# Patient Record
Sex: MALE | Race: WHITE | NOT HISPANIC OR LATINO | Employment: OTHER | URBAN - METROPOLITAN AREA
[De-identification: names, ages, dates, MRNs, and addresses within clinical notes are randomized per-mention and may not be internally consistent; named-entity substitution may affect disease eponyms.]

---

## 2018-03-09 ENCOUNTER — OFFICE VISIT (OUTPATIENT)
Dept: ENDOCRINOLOGY | Facility: HOSPITAL | Age: 68
End: 2018-03-09
Payer: MEDICARE

## 2018-03-09 VITALS
DIASTOLIC BLOOD PRESSURE: 90 MMHG | WEIGHT: 213.6 LBS | BODY MASS INDEX: 33.53 KG/M2 | SYSTOLIC BLOOD PRESSURE: 138 MMHG | HEART RATE: 100 BPM | HEIGHT: 67 IN

## 2018-03-09 DIAGNOSIS — E04.1 THYROID NODULE: ICD-10-CM

## 2018-03-09 DIAGNOSIS — E04.2 GOITER, NONTOXIC, MULTINODULAR: ICD-10-CM

## 2018-03-09 DIAGNOSIS — E89.0 POSTOPERATIVE HYPOTHYROIDISM: Primary | ICD-10-CM

## 2018-03-09 PROBLEM — E03.9 ACQUIRED HYPOTHYROIDISM: Status: ACTIVE | Noted: 2018-03-09

## 2018-03-09 PROCEDURE — 99214 OFFICE O/P EST MOD 30 MIN: CPT | Performed by: INTERNAL MEDICINE

## 2018-03-09 RX ORDER — LEVOTHYROXINE SODIUM 88 MCG
88 TABLET ORAL DAILY
Qty: 90 TABLET | Refills: 3 | Status: SHIPPED | OUTPATIENT
Start: 2018-03-09 | End: 2019-03-09 | Stop reason: SDUPTHER

## 2018-03-09 RX ORDER — NITROGLYCERIN 0.4 MG/1
TABLET SUBLINGUAL
Refills: 0 | COMMUNITY
Start: 2017-12-29

## 2018-03-09 RX ORDER — ASPIRIN 81 MG/1
81 TABLET ORAL DAILY
COMMUNITY

## 2018-03-09 RX ORDER — LISINOPRIL 2.5 MG/1
2.5 TABLET ORAL DAILY
Refills: 6 | COMMUNITY
Start: 2018-03-03

## 2018-03-09 RX ORDER — METOPROLOL SUCCINATE 50 MG/1
50 TABLET, EXTENDED RELEASE ORAL DAILY
Refills: 7 | COMMUNITY
Start: 2018-02-21

## 2018-03-09 RX ORDER — LEVOTHYROXINE SODIUM 88 MCG
88 TABLET ORAL DAILY
Refills: 4 | COMMUNITY
Start: 2017-12-10 | End: 2018-03-09 | Stop reason: SDUPTHER

## 2018-03-09 RX ORDER — TICAGRELOR 90 MG/1
90 TABLET ORAL 2 TIMES DAILY
Refills: 11 | COMMUNITY
Start: 2018-01-28 | End: 2019-03-11 | Stop reason: ALTCHOICE

## 2018-03-09 RX ORDER — DIPHENOXYLATE HYDROCHLORIDE AND ATROPINE SULFATE 2.5; .025 MG/1; MG/1
1 TABLET ORAL DAILY
COMMUNITY

## 2018-03-09 RX ORDER — CHLORAL HYDRATE 500 MG
1 CAPSULE ORAL DAILY
COMMUNITY

## 2018-03-09 RX ORDER — SIMVASTATIN 40 MG
40 TABLET ORAL DAILY
Refills: 0 | COMMUNITY
Start: 2017-12-18
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
## 2018-03-09 NOTE — PATIENT INSTRUCTIONS
Thyroid blood work excellent  Continue Synthroid 88 mcg daily  Call if any symptoms of hypothyroidism  Follow up in 1 year with blood work  Hypothyroidism   AMBULATORY CARE:   Hypothyroidism  is a condition that develops when the thyroid gland does not make enough thyroid hormone  Thyroid hormones help control body temperature, heart rate, growth, and weight  Common signs and symptoms include the following: The signs and symptoms may develop slowly, sometimes over several years  · Exhaustion    · Sensitivity to cold    · Headaches or decreased concentration    · Muscle aches or weakness    · Constipation     · Dry, flaky skin or brittle nails    · Thinning hair    · Heavy or irregular monthly periods    · Depression or irritability  Call 911 for any of the following:   · You have sudden chest pain or shortness of breath  · You have a seizure  · You feel like you are going to faint  Seek care immediately if:   · You have diarrhea, tremors, or trouble sleeping  · Your legs, ankles, or feet are swollen  Contact your healthcare provider if:   · You have a fever  · You have chills, a cough, or feel weak and achy  · You have pain and swelling in your muscles and joints  · Your skin is itchy, swollen, or you have a rash  · Your signs and symptoms return or get worse, even after treatment  · You have questions or concerns about your condition or care  Treatment:  Thyroid hormone replacement medicine may bring your thyroid hormone level back to normal  Ask your healthcare provider for more information on other medicines you may need  Follow up with your healthcare provider as directed: You may need to return for more blood tests to check your thyroid hormone level  This will show if you are getting the right amount of thyroid medicine  Write down your questions so you remember to ask them during your visits    © 2017 [employer] Information is for End User's use only and may not be sold, redistributed or otherwise used for commercial purposes  All illustrations and images included in CareNotes® are the copyrighted property of A D [employer] M [employer]  or Yonatan Tellez  The above information is an  only  It is not intended as medical advice for individual conditions or treatments  Talk to your doctor, nurse or pharmacist before following any medical regimen to see if it is safe and effective for you
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
## 2019-03-02 LAB
T4 FREE SERPL-MCNC: 1.41 NG/DL (ref 0.82–1.77)
TSH SERPL DL<=0.005 MIU/L-ACNC: 3.16 UIU/ML (ref 0.45–4.5)

## 2019-03-09 DIAGNOSIS — E04.2 GOITER, NONTOXIC, MULTINODULAR: ICD-10-CM

## 2019-03-09 DIAGNOSIS — E89.0 POSTOPERATIVE HYPOTHYROIDISM: ICD-10-CM

## 2019-03-09 DIAGNOSIS — E04.1 THYROID NODULE: ICD-10-CM

## 2019-03-11 ENCOUNTER — OFFICE VISIT (OUTPATIENT)
Dept: ENDOCRINOLOGY | Facility: HOSPITAL | Age: 69
End: 2019-03-11
Payer: MEDICARE

## 2019-03-11 VITALS
WEIGHT: 221 LBS | DIASTOLIC BLOOD PRESSURE: 90 MMHG | BODY MASS INDEX: 34.69 KG/M2 | HEART RATE: 88 BPM | HEIGHT: 67 IN | SYSTOLIC BLOOD PRESSURE: 142 MMHG

## 2019-03-11 DIAGNOSIS — E04.2 GOITER, NONTOXIC, MULTINODULAR: ICD-10-CM

## 2019-03-11 DIAGNOSIS — E89.0 POSTOPERATIVE HYPOTHYROIDISM: Primary | ICD-10-CM

## 2019-03-11 DIAGNOSIS — E04.1 THYROID NODULE: ICD-10-CM

## 2019-03-11 PROCEDURE — 99214 OFFICE O/P EST MOD 30 MIN: CPT | Performed by: INTERNAL MEDICINE

## 2019-03-11 RX ORDER — LEVOTHYROXINE SODIUM 88 MCG
88 TABLET ORAL DAILY
Qty: 90 TABLET | Refills: 3 | Status: SHIPPED | OUTPATIENT
Start: 2019-03-11 | End: 2020-06-01

## 2019-03-11 RX ORDER — LEVOTHYROXINE SODIUM 88 MCG
TABLET ORAL
Qty: 90 TABLET | Refills: 2 | Status: SHIPPED | OUTPATIENT
Start: 2019-03-11 | End: 2019-03-11 | Stop reason: SDUPTHER
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
## 2019-03-11 NOTE — PATIENT INSTRUCTIONS
Thyroid blood work is normal but a bit more on the underactive side than last year  Since feeling ok, stay on synthroid 88 mcg daily  Follow up in 1 year with blood work and thyroid ultrasound

## 2020-03-17 ENCOUNTER — TELEPHONE (OUTPATIENT)
Dept: ENDOCRINOLOGY | Facility: HOSPITAL | Age: 70
End: 2020-03-17

## 2020-03-17 NOTE — TELEPHONE ENCOUNTER
Received signed request for release of information  Medical records to be sent to Dr Finesse Jaime in [place], [place]  Faxed request to [facility] & paper chart)

## 2020-05-31 DIAGNOSIS — E89.0 POSTOPERATIVE HYPOTHYROIDISM: ICD-10-CM

## 2020-05-31 DIAGNOSIS — E04.2 GOITER, NONTOXIC, MULTINODULAR: ICD-10-CM

## 2020-05-31 DIAGNOSIS — E04.1 THYROID NODULE: ICD-10-CM

## 2020-06-01 RX ORDER — LEVOTHYROXINE SODIUM 88 MCG
TABLET ORAL
Qty: 90 TABLET | Refills: 0 | Status: SHIPPED | OUTPATIENT
Start: 2020-06-01

## 2020-08-05 DIAGNOSIS — E04.2 GOITER, NONTOXIC, MULTINODULAR: ICD-10-CM

## 2020-08-05 DIAGNOSIS — E04.1 THYROID NODULE: ICD-10-CM

## 2020-08-05 DIAGNOSIS — E89.0 POSTOPERATIVE HYPOTHYROIDISM: ICD-10-CM

## 2020-08-05 RX ORDER — LEVOTHYROXINE SODIUM 88 MCG
TABLET ORAL
Qty: 90 TABLET | Refills: 0 | OUTPATIENT
Start: 2020-08-05

## 2020-08-26 DIAGNOSIS — E04.2 GOITER, NONTOXIC, MULTINODULAR: ICD-10-CM

## 2020-08-26 DIAGNOSIS — E89.0 POSTOPERATIVE HYPOTHYROIDISM: ICD-10-CM

## 2020-08-26 DIAGNOSIS — E04.1 THYROID NODULE: ICD-10-CM

## 2020-08-26 RX ORDER — LEVOTHYROXINE SODIUM 88 MCG
TABLET ORAL
Qty: 90 TABLET | Refills: 0 | OUTPATIENT
Start: 2020-08-26

## 2024-05-28 LAB — HBA1C MFR BLD HPLC: 8.9 %

## 2024-05-31 LAB
CREAT ?TM UR-SCNC: 70 UMOL/L
EXT ALBUMIN URINE RANDOM: 1.4
HBA1C MFR BLD HPLC: 8.8 %
MICROALBUMIN/CREAT UR: 20 MG/G{CREAT}